# Patient Record
Sex: MALE | Race: WHITE | Employment: OTHER
[De-identification: names, ages, dates, MRNs, and addresses within clinical notes are randomized per-mention and may not be internally consistent; named-entity substitution may affect disease eponyms.]

---

## 2021-10-22 ENCOUNTER — NURSE TRIAGE (OUTPATIENT)
Dept: OTHER | Facility: CLINIC | Age: 71
End: 2021-10-22

## 2021-10-22 NOTE — TELEPHONE ENCOUNTER
Received call from [address] [facility] with Red Flag Complaint. Brief description of triage:   Pt c/o cluster headaches. Triage indicates for patient to see a provider within the next 2 weeks. Care advice provided, patient verbalizes understanding; denies any other questions or concerns; instructed to call back for any new or worsening symptoms. Writer provided warm transfer to [facility] for appointment scheduling. Attention Provider: Thank you for allowing me to participate in the care of your patient. The patient was connected to triage in response to information provided to the ECC/PSC. Please do not respond through this encounter as the response is not directed to a shared pool. Reason for Disposition   Headache is a chronic symptom (recurrent or ongoing AND present > 4 weeks)    Answer Assessment - Initial Assessment Questions  1. LOCATION: \"Where does it hurt? \"       Pt reports cluster headaches behind his right eye    2. ONSET: \"When did the headache start? \" (Minutes, hours or days)       Beginning of October     3. PATTERN: \"Does the pain come and go, or has it been constant since it started? \"      Comes and goes. He has 1-2 every day. Denies cluster headache currently. 4. SEVERITY: \"How bad is the pain? \" and \"What does it keep you from doing? \"  (e.g., Scale 1-10; mild, moderate, or severe)    - MILD (1-3): doesn't interfere with normal activities     - MODERATE (4-7): interferes with normal activities or awakens from sleep     - SEVERE (8-10): excruciating pain, unable to do any normal activities         Denies pain currently     5. RECURRENT SYMPTOM: \"Have you ever had headaches before? \" If so, ask: \"When was the last time? \" and \"What happened that time? \"       Yes, pt states that he has had cluster headaches for 40 years. He takes Imitrex as needed. 6. CAUSE: \"What do you think is causing the headache? \"      Cluster headaches    7.  MIGRAINE: \"Have you been diagnosed with migraine headaches? \" If so, ask: \"Is this headache similar? \"       Denies     8. HEAD INJURY: \"Has there been any recent injury to the head? \"       Denies     9. OTHER SYMPTOMS: \"Do you have any other symptoms? \" (fever, stiff neck, eye pain, sore throat, cold symptoms)      Denies     10. PREGNANCY: \"Is there any chance you are pregnant? \" \"When was your last menstrual period? \"        N/a    Protocols used: HEADACHE-ADULT-AH

## 2022-07-22 ENCOUNTER — HOSPITAL ENCOUNTER (OUTPATIENT)
Dept: PHYSICAL THERAPY | Age: 72
Setting detail: THERAPIES SERIES
Discharge: HOME | End: 2022-07-22
Payer: MEDICARE

## 2022-07-22 PROCEDURE — 97161 PT EVAL LOW COMPLEX 20 MIN: CPT | Performed by: PHYSICAL THERAPIST

## 2022-07-22 PROCEDURE — 97530 THERAPEUTIC ACTIVITIES: CPT | Performed by: PHYSICAL THERAPIST

## 2022-07-22 PROCEDURE — 97110 THERAPEUTIC EXERCISES: CPT | Performed by: PHYSICAL THERAPIST

## 2022-07-22 NOTE — PLAN OF CARE
[facility]patient [facility]  [address] [place], [place] [address], [address]  Phone: [phone]   Fax: [phone]                                            Physical Therapy Certification    Dear Dr Campos Espinoza,    We had the pleasure of evaluating the following patient for physical therapy services at [facility] ([facility] A summary of our findings can be found in the initial assessment below. This includes our plan of care. If you have any questions or concerns regarding these findings, please do not hesitate to contact me at the office phone number checked above. Thank you for the referral.       Physician Signature:_______________________________Date:__________________  By signing above (or electronic signature), therapist's plan is approved by physician      Patient: Kimberli Pina   : 1950   MRN: [id number]  Referring Physician:  Campos Espinoza MD      Evaluation Date: 2022      Medical Diagnosis Information:  Diagnosis: M25.552 (ICD-10-CM) - Pain in left hip;  M25.559 (ICD-10-CM) - Pain in joint, pelvic region and thigh   Treatment Diagnosis: M25.552 - Left Hip Pain                                         Insurance information: PT Insurance Information: Medicare     Precautions/ Contra-indications:   Latex Allergy:  [x]NO      []YES   Preferred Language for Healthcare:   [x]English       []other:  C-SSRS Triggered by Intake questionnaire (Past 2 wk assessment):   [x] No, Questionnaire did not trigger screening.   [] Yes, Patient intake triggered further evaluation      [] C-SSRS Screening completed  [] PCP notified via Plan of Care  [] Emergency services notified    SUBJECTIVE:  History of Present Illness:      Pt presents with c/o Left hip pain, specifically after prolonged sitting. States pain is alleviated after being up and moving around for a while. States that in March of this year, he went on a long run, which seemed to exacerbate symptoms.  Pt reports that he is still able to walk for > 1 hr and runs just about 5 days/week for about 40 min. Pt reports pain is over lateral hip, and into anterior thigh. Pain       Patient reports pain is 5 /10 pain at present and 5 /10 pain at its worst.  Pain increases with: prolonged sitting, initial stance after sitting (takes a while to loosen up)        Decreases with: rest    Pain description:  sharp  Pt. reports pain with coughing, sneezing and laughing:   []Yes   [x]No    []NA     Current Functional Limitations:   [x]Yes   []No  Functional Complaints:      PLOF:   [x]No functional limitations   []Pre-exisiting limitations:  Pt's sleep is affected?    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    Co-morbidities/Complexities (which will affect course of rehabilitation): 2  []None           Arthritic conditions   []Rheumatoid arthritis (M05.9)  []Osteoarthritis (M19.91)   Cardiovascular conditions   [x]Hypertension (I10)  []Hyperlipidemia (E78.5)  []Angina pectoris (I20)  []Atherosclerosis (I70)   Musculoskeletal conditions   []Disc pathology   []Congenital spine pathologies   []Prior surgical intervention  []Osteoporosis (M81.8)  [x]Osteopenia (M85.8)   Endocrine conditions   []Hypothyroid (E03.9)  []Hyperthyroid Gastrointestinal conditions   []Constipation (F90.01)   Metabolic conditions   []Morbid obesity (E66.01)  []Diabetes type 1(E10.65) or 2 (E11.65)   []Neuropathy (G60.9)     Pulmonary conditions   []Asthma (J45)  []Coughing   []COPD (J44.9)   Psychological Disorders  []Anxiety (F41.9)  []Depression (F32.9)   []Other:   []Covid-19    []Other:         Occupation/School:  - working part time    Sports/Hobbies/Recreational Activities: running    OBJECTIVE:     Functional Scale: FOTO   Score: 77/100 (23% impaired)    Gait/Steps     [x]Gait WNL unless otherwise noted below:                             []Deviations on a level linoleum surface include:      [x]Steps WNL (reciprocal pattern with 0-1 rail) unless otherwise noted below:    []Deviations include:       Posture: [] WNL  [x]Forward head   []Forward flexed trunk    []Scoliosis   []Decreased WB on   []R   []L     [x]Other:  thoracic kyphosis     Quick Tests/Functional Myotome Tests:   Heel Walk (L4):      [x]NT  []Able to perform WNL   []Unable to perform         Toe Walk (S1):       [x]NT  []Able to perform WNL   []Unable to perform        Flexibility    [] All tested [facility]    [x] Deficits indicated as follows:    Muscle Abnormal Findings   Hip flexors/Gee  []Decreased R   [x]Decreased L      Hamstrings  Degrees in 90/90 [x]Decreased R   [x]Decreased L         Right:                   Left:      Gastrocs   []Decreased R   []Decreased L      Obers/TFL/ITB   []Decreased R   [x]Decreased L      Piriformis    []Decreased R   []Decreased L      Other:    []Decreased R   []Decreased L        Lower Extremity Range of Motion/Strength Testing-Myotomes    [x]All ROM WFL except as marked below   [x]All strength WFL (5/5) except as marked below    [x]All myotomes WFL (5/5) except as marked below      Range Tested MMT/ Resisted PROM AROM Comments   *denotes pain Left Right Left Right Left Right    Hip Flexion  (L1-2) 4/5 4+/5        Hip Extension          Hip Abduction  (L5) 4+/5 4+/5        Hip Adduction  (L3)          Hip IR          Hip ER          Knee Flexion  (L5,S1) 5/5 5/5        Knee Extension  (L3,4) 4/5 4+/5        Ankle Dorsiflex  (L4) 5/5 5/5            Special Tests Lumbosacral and hip- supine/sidelying/prone    (L) = Laslett's Criteria: 2 positive tests  Special Test Abnormal Findings   Sit up test/ Supine Long sit test  (C) []Neg   []Pos R   []Pos L     Comments:    SI distraction                               (L) []Neg   []Pos   []NT   Thigh Thrust test                         (L) []Neg   []Pos R   []Pos L      90/90 test  []Neg   []Pos R   []Pos L      Gaenslen's test []Neg   []Pos R   []Pos L      Straight Leg Raise [x]Neg   []Pos R   []Pos L      Crams []Neg   []Pos R   []Pos L      Lumbar Distraction  []Relief noted   []No relief noted  []Rebound pain   []NT   Hip scour [x]Neg   []Pos R   []Pos L      Chandu's test [x]Neg   []Pos R   []Pos L      Rober's test []Neg   []Pos R   []Pos L      Oscillation []Neg   []Pos R   []Pos L      Ant/Post Provocation  []Neg   []Pos R   []Pos L      SI compression                           (L) []Neg   []Pos      Prone knee flexion test               (C) [x]Neg   []Pos R   []Pos L     Comments:    Femoral nerve tension test []Neg   []Pos R   []Pos L      Pheasant test []Neg   []Pos R   []Pos L      Sacral thrusts                              (L) []Neg   []Pos     []Base   []Norman   []R Sacral Sulcus  []L Sacral Sulcus   []R CHAVA   []L CHAVA     Deep Tendon Reflexes     [x] Not Tested   []All reflexes WNL or 2+ except as marked below  Abnormal Reflex Findings Left Right Comments   Elvira's Reflex      Biceps (C5,6)      Brachioradialis (C6)      Triceps (C7)      Quadriceps (L3,4)     []Pendular x 3 R  []Pendular x 3 L   Achilles (S1,2)      Ankle clonus , # of beats      Babinski's reflex           Dermatomal Sensation   [x]All dermatomes WFL for light touch except as marked below  Abnormal Dermatome Findings Left Right   Anterior groin, 2-3 inches below ASIS (L1-L2)     Middle third anterior thigh (L3)     Patella and med malleolus (L4)     Fibular head and dorsum of foot (L5)     Lateral side and plantar surface of foot (S1)     Medial aspect of posterior thigh (S2)                   Palpation     Patient reported tenderness with palpation:  []Yes   [x]No   []NA  Location:    PT notes warmth:  []Yes   [x]No   []NA  Location:   PT notes increased muscle tone:    []Yes   [x]No   []NA  Location:   PT notes crepitus with palpation:    []Yes   [x]No   []NA   Location:   Ligament tenderness/provocation:    []Yes   [x]No   []NA  Location:   PT notes decreased scar mobility:    []Yes   [x]No   []NA  Location:      Appearance    PT notes swelling:    []Yes   []No   []NA  Location:     PT notes redness:   []Yes   []No   []NA  Location:   PT notes drainage:    []Yes   []No   []NA  Location:      Girth Measurements (cm)   [x]NT     Location Left Right                              Specific Joint Mobility Testing/Accessory Motions:    [x]NT  Lumbar:  Hip:  Knee/patella: Ankle:    Bandages/Dressings/Incisions: [x]N/A    Falls Risk Assessment (30 days):   [x] Falls Risk assessed and no intervention required. [] Falls Risk assessed and Patient requires intervention due to being higher risk   TUG score (>12s at risk):     [] Falls education provided, including:                       [x] Patient history, allergies, meds reviewed. Medical chart reviewed. See intake form. Review Of Systems (ROS):  [x]Performed Review of systems (Integumentary, CardioPulmonary, Neurological) by intake and observation. Intake form has been scanned into medical record. Patient has been instructed to contact their primary care physician regarding ROS issues if not already being addressed at this time. ASSESSMENT: Pt is  71 Y. O  male, presenting with c/o  L hip pain. Assessment reveals deficits in strength, ROM, alignment as well as increased pain. Pt will benefit from skilled PT to address these deficits and promote return to highest level of functional independence.       Barriers to/and or personal factors that will affect rehab potential:           []Age  []Sex    []Smoker            []Motivation/Lack of Motivation                      [x]Co-Morbidities            []Cognitive Function, education/learning barriers            []Environmental, home barriers            []profession/work barriers  [x]past PT/medical experience  []other:  Justification:     Functional Impairments:     []Noted lumbar/proximal hip/LE hypomobility   [x]Decreased LE functional ROM   []Decreased core/proximal hip strength and neuromuscular control   [x]Decreased LE functional strength   []Reduced balance/proprioceptive control   []Other:      Functional Activity Limitations (from functional questionnaire and intake)   []Reduced ability to tolerate prolonged functional positions   [x]Reduced ability or difficulty with changes of positions or transfers between positions   []Reduced ability to maintain good posture and demonstrate good body mechanics with sitting, bending, and lifting   []Reduced ability to sleep   []Reduced ability or tolerance with driving and/or computer work   []Reduced ability to perform lifting, carrying tasks   [x]Reduced ability to squat   []Reduced ability to forward bend   []Reduced ability to ambulate prolonged functional periods/distances/surfaces   []Reduced ability to ascend/descend stairs   []Reduced ability to run, hop or jump   [x]Other: kneeling     Participation Restrictions   []Reduced participation in self-care activities   []Reduced participation in home management activities   [x]Reduced participation in work activities   []Reduced participation in social activities   []Reduced participation in sport activities    Classification :    []Signs/symptoms consistent with post-surgical status including decreased ROM, strength and function.    [x]Signs/symptoms consistent with joint sprain/strain   []Signs/symptoms consistent with Osteoarthritis   []Signs/symptoms consistent with functional weakness/NMR control      []Signs/symptoms consistent with tendinitis/tendinosis    []Signs/symptoms consistent with pathology which may benefit from Dry needling     []Other:     Prognosis/Rehab Potential:      []Excellent   [x]Good    []Fair   []Poor    Tolerance of evaluation/treatment:    []Excellent   [x]Good    []Fair   []Poor     Physical Therapy Evaluation Complexity Justification  [x] A history of present problem with:  [] no personal factors and/or comorbidities that impact the plan of care;  [x]1-2 personal factors and/or comorbidities that impact the plan of care  []3 personal factors and/or comorbidities that impact the plan of care  [x] An examination of body systems using standardized tests and measures addressing any of the following: body structures and functions (impairments), activity limitations, and/or participation restrictions:  [x] a total of 1-2 or more elements   [] a total of 3 or more elements   [] a total of 4 or more elements   [x] A clinical presentation with:  [x] stable and/or uncomplicated characteristics   [] evolving clinical presentation with changing characteristics  [] unstable and unpredictable characteristics;   [x] Clinical decision making of [] low, [] moderate, [] high complexity using standardized patient assessment instrument and/or measurable assessment of functional outcome. [x] EVAL (LOW) 66057 (typically 20 minutes face-to-face)  [] EVAL (MOD) 70772 (typically 30 minutes face-to-face)  [] EVAL (HIGH) 29124 (typically 45 minutes face-to-face)  [] RE-EVAL    PLAN:  Frequency/Duration:  2 days per week for 8 Weeks:  Interventions:  [x]  (40783) Therapeutic exercise including: strength training, ROM, for Lower extremity and core   [x]  (94769) NMR activation and proprioception for LE, Glutes and Core. [x]  (26359) Manual therapy as indicated for LE, Hip and spine to include: Dry Needling/IASTM, STM, PROM, Gr I-IV mobilizations, manipulation.    [x] (08600) Therapeutic activities for LE and core.  []  (16739) Gait Training including gait normalization and reducing fall risk. [x]  Modalities as needed that may include: thermal agents, E-stim, Biofeedback, US, iontophoresis as indicated  [x]  Patient education on joint protection, postural re-education, activity modification, progression of HEP      GOALS:  Patient stated goal: \"Eliminate hip/thigh tightness\"  [] Progressing: [] Met: [] Not Met: [] Adjusted    Therapist goals for Patient:   Short Term Goals: To be achieved in: 2 weeks  1. Independent in HEP and progression per patient tolerance, in order to prevent re-injury. [] Progressing: [] Met: [] Not Met: [] Adjusted  2. Patient will have a decrease in pain to facilitate improvement in movement, function, and ADLs as indicated by Functional Deficits. [] Progressing: [] Met: [] Not Met: [] Adjusted    Long Term Goals: To be achieved in: 8 weeks  1. Disability index score of 15% or less on the FOTO Hip to assist with reaching prior level of function. [] Progressing: [] Met: [] Not Met: [] Adjusted  2. Patient will demonstrate increased L Hip functional AROM WNL and equal to non-involved side to allow for proper joint functioning as indicated by patients Functional Deficits. [] Progressing: [] Met: [] Not Met: [] Adjusted  3. Patient will demonstrate an increase in Strength to good proximal hip strength and control, to allow for proper functional mobility as indicated by patients Functional Deficits. [] Progressing: [] Met: [] Not Met: [] Adjusted  4. Patient will return to running/walking with briefcase without increased symptoms or restriction.    [] Progressing: [] Met: [] Not Met: [] Adjusted      Electronically signed by:   , THELMA [id number]   Lisy Monaco, PT, PT, DPT

## 2022-07-22 NOTE — FLOWSHEET NOTE
[facility] Outpatient Therapy  [address] [place] [address] [address], [address]  Phone: [phone]   Fax: [phone]    Physical Therapy Treatment Note/ Progress Report:     Date:  2022    Patient Name:  Palma Oviedo    :  1950  MRN: [id number]    Medical/Treatment Diagnosis Information:  Diagnosis: M25.552 (ICD-10-CM) - Pain in left hip;  M25.559 (ICD-10-CM) - Pain in joint, pelvic region and thigh  Treatment Diagnosis: M25.552 - Left Hip Pain  Insurance/Certification information:  PT Insurance Information: Medicare  Physician Information:  Asha Ron MD  Plan of care signed:    [] Yes  [x] No    Date of Patient follow up with Physician:      Progress Report: []  Yes  [x]  No     Date Range for reporting period:  Beginnin2022  Ending:      Progress report due (10 Rx/or 30 days whichever is less): 4772     Recertification due (POC duration/ or 90 days whichever is less): 2022     Visit # Insurance Allowable Auth Needed    BMN []Yes   [x]No     RESTRICTIONS/PRECAUTIONS:   Latex Allergy:  [x]NO      []YES  Preferred Language for Healthcare:   [x]English       []other:    Pain level:  5/10     SUBJECTIVE:  See Eval    OBJECTIVE:  See Eval  Observation:   Test measurements:      Functional Scale: FOTO Hip   Score: 77/100 (23% impairment)  Date assessed: 2022      Exercises/Interventions: Exercises in bold performed in department today. Items not bolded are carried forward from prior visits for continuity of the record. Exercise/Equipment Resistance/Repetitions HEP Other comments   See HEP Trial all HEP exercises 10 x  []      []      []      []      []      []      []      []      []      []      []      []      []      []      []      []      []      []      Home Exercise Program:  Access Code: [email or web address]  URL: [email or web address]  Date: 2022  Prepared by: Estella Avila    Exercises  ITB Stretch at Wall - 1 x daily - 7 x weekly - 10 reps - 10 hold  Supine ITB Stretch with Strap - 1 x daily - 7 x weekly - 10 reps - 10 hold  Prone Quadriceps Stretch with Strap - 1 x daily - 7 x weekly - 10 reps - 10 hold      Therapeutic Exercise: (13 min)   [x] (97161) Provided verbal/tactile cueing for activities related to strengthening, flexibility, endurance, ROM for improvements in LE, proximal hip, and core control with self-care, mobility, lifting, ambulation. Neuromuscular Facilitation:   [] (77425) Provided verbal/tactile cueing for activities related to improving balance, coordination, kinesthetic sense, posture, motor skill, proprioception to assist with LE, proximal hip, and core control in self-care, mobility, lifting, ambulation and eccentric single leg control. Therapeutic Activities: (12 min)  PT educates patient on HEP, therapy plan of care, activity modification and postures/carrying techniques. [x] (79749) Provided verbal/tactile cueing for activities related to improving balance, coordination, kinesthetic sense, posture, motor skill, proprioception and motor activation to allow for proper function of core, proximal hip and LE with self-care and ADLs and functional mobility.      Gait Training:   [] (00942) Provided training and instruction to the patient for proper LE, core and proximal hip recruitment and positioning and eccentric body weight control with ambulation re-education including up and down stairs     Home Exercise Program:    [] (47818) Reviewed/Progressed HEP activities related to strengthening, flexibility, endurance, ROM of core, proximal hip and LE for functional self-care, mobility, lifting and ambulation/stair navigation   [] (07855) Reviewed/Progressed HEP activities related to improving balance, coordination, kinesthetic sense, posture, motor skill, proprioception of core, proximal hip and LE for self-care, mobility, lifting, and ambulation/stair navigation      Manual Treatments:   [] (81675) Provided manual therapy to mobilize LE, proximal hip and/or LS spine soft tissue/joints for the purpose of modulating pain, promoting relaxation,  increasing ROM, reducing/eliminating soft tissue swelling/inflammation/restriction, improving soft tissue extensibility and allowing for proper ROM for normal function with self-care, mobility, lifting and ambulation. Modalities:    [] Electric Stimulation:   [] Ultrasound:   [] Other:       Charges:  Timed Code Treatment Minutes: 25 min   Total Treatment Minutes: 50 min      [x] EVAL (LOW) 76133 (typically 20 minutes face-to-face)  [] EVAL (MOD) 00401 (typically 30 minutes face-to-face)  [] EVAL (HIGH) 35699 (typically 45 minutes face-to-face)  [] RE-EVAL     [x] YA(04178) x     1  [] NMR (36513) x       [] Manual (54855) x       [x] TA (40241) x     1  [] Gait Training (03113) x       [] ES(attended) (88467)  [] ES (un) (28870)   [] Mech Traction (99024)  [] Ultrasound (40743)  [] Other:      GOALS:  Patient stated goal: \"Eliminate hip/thigh tightness\"  [] Progressing: [] Met: [] Not Met: [] Adjusted     Therapist goals for Patient:  Short Term Goals: To be achieved in: 2 weeks  1. Independent in HEP and progression per patient tolerance, in order to prevent re-injury. [] Progressing: [] Met: [] Not Met: [] Adjusted  2. Patient will have a decrease in pain to facilitate improvement in movement, function, and ADLs as indicated by Functional Deficits. [] Progressing: [] Met: [] Not Met: [] Adjusted     Long Term Goals: To be achieved in: 8 weeks  1. Disability index score of 15% or less on the FOTO Hip to assist with reaching prior level of function. [] Progressing: [] Met: [] Not Met: [] Adjusted  2. Patient will demonstrate increased L Hip functional AROM WNL and equal to non-involved side to allow for proper joint functioning as indicated by patients Functional Deficits. [] Progressing: [] Met: [] Not Met: [] Adjusted  3.  Patient will demonstrate an increase in Strength to good proximal hip strength and control, to allow for proper functional mobility as indicated by patients Functional Deficits. [] Progressing: [] Met: [] Not Met: [] Adjusted  4. Patient will return to running/walking with briefcase without increased symptoms or restriction. [] Progressing: [] Met: [] Not Met: [] Adjusted    ASSESSMENT:  See Eval    Treatment/Activity Tolerance:  [x] Patient tolerated treatment well [] Patient limited by fatique  [] Patient limited by pain  [] Patient limited by other medical complications  [] Other:     Overall Progression Towards Functional goals/ Treatment Progress Update:  [] Patient is progressing as expected towards functional goals listed. [] Progression is slowed due to complexities/Impairments listed. [] Progression has been slowed due to co-morbidities. [x] Plan just implemented, too soon to assess goals progression <30days   [] Goals require adjustment due to lack of progress  [] Patient is not progressing as expected and requires additional follow up with physician  [] Other    Prognosis for POC: [x] Good [] Fair  [] Poor    Patient requires continued skilled intervention: [x] Yes  [] No        PLAN: See eval  [] Continue per plan of care [] Alter current plan (see comments)  [x] Plan of care initiated [] Hold pending MD visit [] Discharge    Electronically signed by:  , PT [id number]   Drea Yeh, PT, PT, DPT    Note: If patient does not return for scheduled/recommended follow up visits, this note will serve as a discharge from care along with the most recent update on progress.

## 2022-07-26 ENCOUNTER — HOSPITAL ENCOUNTER (OUTPATIENT)
Dept: PHYSICAL THERAPY | Age: 72
Setting detail: THERAPIES SERIES
Discharge: HOME | End: 2022-07-26
Payer: MEDICARE

## 2022-07-26 NOTE — PATIENT CARE CONFERENCE
[facility]  [address], [address]  Phone: [phone]   Fax: [phone]    Physical Therapy Missed Visit Note     Date:  2022    Patient Name:  Mi Vinson      :  1950    MRN: [id number]      Cancelled visits to date: 0  No-shows to date: 1    For today's appointment patient:  []  Cancelled  []  Rescheduled appointment  [x]  No-show     Reason given by patient:  []  Patient ill  []  Conflicting appointment  []  No transportation    []  Conflict with work  [x]  No reason given  []  Other:     Comments:      Electronically signed by:  Austin Goldberg PT, PT

## 2022-08-01 ENCOUNTER — HOSPITAL ENCOUNTER (OUTPATIENT)
Dept: PHYSICAL THERAPY | Age: 72
Setting detail: THERAPIES SERIES
Discharge: HOME | End: 2022-08-01
Payer: MEDICARE

## 2022-08-01 PROCEDURE — 97110 THERAPEUTIC EXERCISES: CPT | Performed by: PHYSICAL THERAPIST

## 2022-08-01 PROCEDURE — 97140 MANUAL THERAPY 1/> REGIONS: CPT | Performed by: PHYSICAL THERAPIST

## 2022-08-01 NOTE — FLOWSHEET NOTE
[facility] Outpatient Therapy  [address] [employer], [address], [address]  Phone: [phone]   Fax: [phone]    Physical Therapy Treatment Note/ Progress Report:     Date: 2022    Patient Name:  Marcell Vora    :  1950  MRN: [id number]    Medical/Treatment Diagnosis Information:  Diagnosis: M25.552 (ICD-10-CM) - Pain in left hip;  M25.559 (ICD-10-CM) - Pain in joint, pelvic region and thigh  Treatment Diagnosis: M25.552 - Left Hip Pain  Insurance/Certification information:  PT Insurance Information: Medicare  Physician Information:  Alex Rajput MD  Plan of care signed:    [x] Yes - see media tab  [] No    Date of Patient follow up with Physician:      Progress Report: []  Yes  [x]  No     Date Range for reporting period:  Beginnin2022  Ending:      Progress report due (10 Rx/or 30 days whichever is less): 2199     Recertification due (POC duration/ or 90 days whichever is less): 2022     Visit # Insurance Allowable Auth Needed    BMN []Yes   [x]No     RESTRICTIONS/PRECAUTIONS:   Latex Allergy:  [x]NO      []YES  Preferred Language for Healthcare:   [x]English       []other:    Pain level:  0/10     SUBJECTIVE:  Pt reports that soreness/tightness is mainly upon first standing up from sitting. Overall feels like the stretches are helping some. OBJECTIVE:    Observation: Bilateral hip flexor and quad tightness  Test measurements:      Functional Scale: FOTO Hip   Score: 77/100 (23% impairment)  Date assessed: 2022      Exercises/Interventions: Exercises in bold performed in department today. Items not bolded are carried forward from prior visits for continuity of the record.     Exercise/Equipment Resistance/Repetitions HEP Other comments   Recumbent Bike 5 min  Resistance 2.5  Seat 11 []    Prone Hip Flexor Stretch with Strap 10 x 10\" hold, R/L [x]    Standing Hip Flexor Stretch 1 x 10\" R/L [x]      []      []      []      [] positioning and eccentric body weight control with ambulation re-education including up and down stairs     Home Exercise Program:    [] (44460) Reviewed/Progressed HEP activities related to strengthening, flexibility, endurance, ROM of core, proximal hip and LE for functional self-care, mobility, lifting and ambulation/stair navigation   [] (27133) Reviewed/Progressed HEP activities related to improving balance, coordination, kinesthetic sense, posture, motor skill, proprioception of core, proximal hip and LE for self-care, mobility, lifting, and ambulation/stair navigation      Manual Treatments: (24 min) PT performs manual hamsting, piriformis, glute, and IT band stretches bilaterally with patient in supine, and manual quad stretching bilaterally with patient in prone. [x] (67923) Provided manual therapy to mobilize LE, proximal hip and/or LS spine soft tissue/joints for the purpose of modulating pain, promoting relaxation,  increasing ROM, reducing/eliminating soft tissue swelling/inflammation/restriction, improving soft tissue extensibility and allowing for proper ROM for normal function with self-care, mobility, lifting and ambulation. Modalities:    [] Electric Stimulation:   [] Ultrasound:   [] Other:       Charges:  Timed Code Treatment Minutes: 39 min   Total Treatment Minutes: 39 min        [] RE-EVAL     [x] GK(12291) x     1  [] NMR (93800) x       [x] Manual (03147) x     2  [] TA (99183) x       [] Gait Training (19906) x       [] ES(attended) (88622)  [] ES (un) (75195)   [] Mech Traction (02912)  [] Ultrasound (77816)  [] Other:      GOALS:  Patient stated goal: \"Eliminate hip/thigh tightness\"  [] Progressing: [] Met: [] Not Met: [] Adjusted     Therapist goals for Patient:  Short Term Goals: To be achieved in: 2 weeks  1. Independent in HEP and progression per patient tolerance, in order to prevent re-injury. [] Progressing: [] Met: [] Not Met: [] Adjusted  2.  Patient will have a decrease in pain to facilitate improvement in movement, function, and ADLs as indicated by Functional Deficits. [] Progressing: [] Met: [] Not Met: [] Adjusted     Long Term Goals: To be achieved in: 8 weeks  1. Disability index score of 15% or less on the FOTO Hip to assist with reaching prior level of function. [] Progressing: [] Met: [] Not Met: [] Adjusted  2. Patient will demonstrate increased L Hip functional AROM WNL and equal to non-involved side to allow for proper joint functioning as indicated by patients Functional Deficits. [] Progressing: [] Met: [] Not Met: [] Adjusted  3. Patient will demonstrate an increase in Strength to good proximal hip strength and control, to allow for proper functional mobility as indicated by patients Functional Deficits. [] Progressing: [] Met: [] Not Met: [] Adjusted  4. Patient will return to running/walking with briefcase without increased symptoms or restriction. [] Progressing: [] Met: [] Not Met: [] Adjusted    ASSESSMENT:  Patient presents to PT with LLE tightness, stiffness, and pain. Patient tolerating therapy well, and seeing improvements in symptoms with stretching. Pt will continue to benefit from skilled PT to reduce pain and to return to PLOF. Treatment/Activity Tolerance:  [x] Patient tolerated treatment well [] Patient limited by fatique  [] Patient limited by pain  [] Patient limited by other medical complications  [] Other:     Overall Progression Towards Functional goals/ Treatment Progress Update:  [] Patient is progressing as expected towards functional goals listed. [] Progression is slowed due to complexities/Impairments listed. [] Progression has been slowed due to co-morbidities.   [x] Plan just implemented, too soon to assess goals progression <30days   [] Goals require adjustment due to lack of progress  [] Patient is not progressing as expected and requires additional follow up with physician  [] Other    Prognosis for POC: [x] Good [] Fair  [] Poor    Patient requires continued skilled intervention: [x] Yes  [] No        PLAN: 2x/week for 8 weeks  [x] Continue per plan of care [] Alter current plan (see comments)  [] Plan of care initiated [] Hold pending MD visit [] Discharge    Electronically signed by:  , PT [id number]   Randa Wang, PT, PT, DPT    Note: If patient does not return for scheduled/recommended follow up visits, this note will serve as a discharge from care along with the most recent update on progress.

## 2022-08-03 ENCOUNTER — HOSPITAL ENCOUNTER (OUTPATIENT)
Dept: PHYSICAL THERAPY | Age: 72
Setting detail: THERAPIES SERIES
Discharge: HOME | End: 2022-08-03
Payer: MEDICARE

## 2022-08-03 PROCEDURE — 97110 THERAPEUTIC EXERCISES: CPT | Performed by: PHYSICAL THERAPIST

## 2022-08-03 PROCEDURE — 97140 MANUAL THERAPY 1/> REGIONS: CPT | Performed by: PHYSICAL THERAPIST

## 2022-08-03 NOTE — FLOWSHEET NOTE
[facility] Outpatient [facility]  [address] [place] [address] [address], [address]  Phone: [phone]   Fax: [phone]    Physical Therapy Treatment Note/ Progress Report:     Date: 2022    Patient Name:  Ev Dalal    :  1950  MRN: [id number]    Medical/Treatment Diagnosis Information:  Diagnosis: M25.552 (ICD-10-CM) - Pain in left hip;  M25.559 (ICD-10-CM) - Pain in joint, pelvic region and thigh  Treatment Diagnosis: M25.552 - Left Hip Pain  Insurance/Certification information:  PT Insurance Information: Medicare  Physician Information:  Juliana Malik MD  Plan of care signed:    [x] Yes - see media tab  [] No    Date of Patient follow up with Physician:      Progress Report: []  Yes  [x]  No     Date Range for reporting period:  Beginnin2022  Ending:      Progress report due (10 Rx/or 30 days whichever is less):      Recertification due (POC duration/ or 90 days whichever is less): 2022     Visit # Insurance Allowable Auth Needed   3  / 16 BMN []Yes   [x]No     RESTRICTIONS/PRECAUTIONS:   Latex Allergy:  [x]NO      []YES  Preferred Language for Healthcare:   [x]English       []other:    Pain level:  0/10     SUBJECTIVE:  Pt reports that he did not have pain or as much stiffness when he woke up this morning. OBJECTIVE:    Observation: Bilateral hip flexor and quad tightness  Test measurements:      Functional Scale: FOTO Hip   Score: 77/100 (23% impairment)  Date assessed: 2022      Exercises/Interventions: Exercises in bold performed in department today. Items not bolded are carried forward from prior visits for continuity of the record.     Exercise/Equipment Resistance/Repetitions HEP Other comments   Recumbent Bike 8 min  Resistance 2.5  Seat 11 []    Prone Hip Flexor Stretch with Strap  [x]    Standing Hip Flexor Stretch  [x]    Foam Roll   Sidelying on L IT Band 3 min [x]    \"The Stick\" Massage to L IT Band 3 min []    Sidelying Clamshells with Resistance 20 x Green TB around knees [x]      []      []      []      []      []      []      []      []      []      []      []      []      Home Exercise Program:  Access Code: [email or web address]  URL: [email or web address]  Date: 07/25/2022  Prepared by: Jaden Cesar    Exercises  ITB Stretch at Wall - 1 x daily - 7 x weekly - 10 reps - 10 hold  Supine ITB Stretch with Strap - 1 x daily - 7 x weekly - 10 reps - 10 hold  Prone Quadriceps Stretch with Strap - 1 x daily - 7 x weekly - 10 reps - 10 hold      Date: 08/01/2022  Prepared by: Jaden Cesar    Exercises  Standing Quadriceps Stretch - 1 x daily - 7 x weekly - 10 reps - 10 hold  Prone Hip Flexor Stretch on Table with Strap - 1 x daily - 7 x weekly - 10 reps - 10 hold  Standing Hip Flexor Stretch - 1 x daily - 7 x weekly - 10 reps - 10 hold  Supine Hamstring Stretch with Strap - 1 x daily - 7 x weekly - 10 reps - 10 hold  Standing Hamstring Stretch with Step - 1 x daily - 7 x weekly - 10 reps - 10 hold      Therapeutic Exercise: (17 min)   [x] (95176) Provided verbal/tactile cueing for activities related to strengthening, flexibility, endurance, ROM for improvements in LE, proximal hip, and core control with self-care, mobility, lifting, ambulation. Neuromuscular Facilitation:   [] (21430) Provided verbal/tactile cueing for activities related to improving balance, coordination, kinesthetic sense, posture, motor skill, proprioception to assist with LE, proximal hip, and core control in self-care, mobility, lifting, ambulation and eccentric single leg control. Therapeutic Activities:   [] (17331) Provided verbal/tactile cueing for activities related to improving balance, coordination, kinesthetic sense, posture, motor skill, proprioception and motor activation to allow for proper function of core, proximal hip and LE with self-care and ADLs and functional mobility.      Gait Training:   [] (73720) Provided training and instruction to the patient for proper LE, core and proximal hip recruitment and positioning and eccentric body weight control with ambulation re-education including up and down stairs     Home Exercise Program:    [] (32280) Reviewed/Progressed HEP activities related to strengthening, flexibility, endurance, ROM of core, proximal hip and LE for functional self-care, mobility, lifting and ambulation/stair navigation   [] (77143) Reviewed/Progressed HEP activities related to improving balance, coordination, kinesthetic sense, posture, motor skill, proprioception of core, proximal hip and LE for self-care, mobility, lifting, and ambulation/stair navigation      Manual Treatments: (33 min) PT performs manual hamsting, piriformis, glute, and IT band stretches bilaterally with patient in supine, and manual quad stretching bilaterally with patient in prone. [x] (94204) Provided manual therapy to mobilize LE, proximal hip and/or LS spine soft tissue/joints for the purpose of modulating pain, promoting relaxation,  increasing ROM, reducing/eliminating soft tissue swelling/inflammation/restriction, improving soft tissue extensibility and allowing for proper ROM for normal function with self-care, mobility, lifting and ambulation. Modalities:    [] Electric Stimulation:   [] Ultrasound:   [] Other:       Charges:  Timed Code Treatment Minutes: 50 min   Total Treatment Minutes: 50 min        [] RE-EVAL     [x] NX(81356) x     1  [] NMR (75248) x       [x] Manual (31969) x     2  [] TA (05053) x       [] Gait Training (28548) x       [] ES(attended) (19349)  [] ES (un) (17725)   [] Mech Traction (16002)  [] Ultrasound (50817)  [] Other:      GOALS:  Patient stated goal: \"Eliminate hip/thigh tightness\"  [] Progressing: [] Met: [] Not Met: [] Adjusted     Therapist goals for Patient:  Short Term Goals: To be achieved in: 2 weeks  1. Independent in HEP and progression per patient tolerance, in order to prevent re-injury.   [] Progressing: [] Met: [] Not Met: [] Adjusted  2. Patient will have a decrease in pain to facilitate improvement in movement, function, and ADLs as indicated by Functional Deficits. [] Progressing: [] Met: [] Not Met: [] Adjusted     Long Term Goals: To be achieved in: 8 weeks  1. Disability index score of 15% or less on the FOTO Hip to assist with reaching prior level of function. [] Progressing: [] Met: [] Not Met: [] Adjusted  2. Patient will demonstrate increased L Hip functional AROM WNL and equal to non-involved side to allow for proper joint functioning as indicated by patients Functional Deficits. [] Progressing: [] Met: [] Not Met: [] Adjusted  3. Patient will demonstrate an increase in Strength to good proximal hip strength and control, to allow for proper functional mobility as indicated by patients Functional Deficits. [] Progressing: [] Met: [] Not Met: [] Adjusted  4. Patient will return to running/walking with briefcase without increased symptoms or restriction. [] Progressing: [] Met: [] Not Met: [] Adjusted    ASSESSMENT:  Patient presents to PT with LLE tightness, stiffness, and pain. Patient tolerating therapy well, and seeing improvements in symptoms with stretching. Pt will continue to benefit from skilled PT to reduce pain and to return to PLOF. Treatment/Activity Tolerance:  [x] Patient tolerated treatment well [] Patient limited by fatique  [] Patient limited by pain  [] Patient limited by other medical complications  [] Other:     Overall Progression Towards Functional goals/ Treatment Progress Update:  [] Patient is progressing as expected towards functional goals listed. [] Progression is slowed due to complexities/Impairments listed. [] Progression has been slowed due to co-morbidities.   [x] Plan just implemented, too soon to assess goals progression <30days   [] Goals require adjustment due to lack of progress  [] Patient is not progressing as expected and requires additional follow up with physician  [] Other    Prognosis for POC: [x] Good [] Fair  [] Poor    Patient requires continued skilled intervention: [x] Yes  [] No        PLAN: 2x/week for 8 weeks  [x] Continue per plan of care [] Alter current plan (see comments)  [] Plan of care initiated [] Hold pending MD visit [] Discharge    Electronically signed by:  , PT [id number]   Shanon Smith, PT, PT, DPT    Note: If patient does not return for scheduled/recommended follow up visits, this note will serve as a discharge from care along with the most recent update on progress.

## 2022-08-08 ENCOUNTER — HOSPITAL ENCOUNTER (OUTPATIENT)
Dept: PHYSICAL THERAPY | Age: 72
Setting detail: THERAPIES SERIES
Discharge: HOME | End: 2022-08-08
Payer: MEDICARE

## 2022-08-08 PROCEDURE — 97110 THERAPEUTIC EXERCISES: CPT | Performed by: PHYSICAL THERAPIST

## 2022-08-08 PROCEDURE — 97140 MANUAL THERAPY 1/> REGIONS: CPT | Performed by: PHYSICAL THERAPIST

## 2022-08-08 NOTE — FLOWSHEET NOTE
[facility] Outpatient Therapy  [address] [place] [address] [address], [address]  Phone: [phone]   Fax: [phone]    Physical Therapy Treatment Note/ Progress Report:     Date: 2022    Patient Name:  Polly Jacobson    :  1950  MRN: [id number]    Medical/Treatment Diagnosis Information:  Diagnosis: M25.552 (ICD-10-CM) - Pain in left hip;  M25.559 (ICD-10-CM) - Pain in joint, pelvic region and thigh  Treatment Diagnosis: M25.552 - Left Hip Pain  Insurance/Certification information:  PT Insurance Information: Medicare  Physician Information:  Dante Tejada MD  Plan of care signed:    [x] Yes - see media tab  [] No    Date of Patient follow up with Physician:      Progress Report: []  Yes  [x]  No     Date Range for reporting period:  Beginnin2022  Ending:      Progress report due (10 Rx/or 30 days whichever is less):      Recertification due (POC duration/ or 90 days whichever is less): 2022     Visit # Insurance Allowable Auth Needed    BMN []Yes   [x]No     RESTRICTIONS/PRECAUTIONS:   Latex Allergy:  [x]NO      []YES  Preferred Language for Healthcare:   [x]English       []other:    Pain level:  0/10     SUBJECTIVE:  Pt reports continued progress and improvement. OBJECTIVE:    Observation: Bilateral hip flexor and quad tightness  Test measurements:  LLE flexibility equal to or better than RLE for hamstrings, hip flexor, piriformis. Functional Scale: FOTO Hip   Score: 77/100 (23% impairment)  Date assessed: 2022      Functional Scale: FOTO Hip   Score: 88/100 (12% impairment)  Date assessed: 2022     Exercises/Interventions: Exercises in bold performed in department today. Items not bolded are carried forward from prior visits for continuity of the record.     Exercise/Equipment Resistance/Repetitions HEP Other comments   Recumbent Bike 8 min  Resistance 2.5  Seat 11 []    Prone Hip Flexor Stretch with Strap  [x]    Standing Hip Flexor Stretch  [x]    Foam Roll   Sidelying on L IT Band 5 min [x]    \"The Stick\" Massage to L IT Band  []    Sidelying Clamshells with Resistance  [x]      []      []      []      []      []      []      []      []      []      []      []      []      Home Exercise Program:  Access Code: [email or web address]  URL: [employer]!/  Date: 07/25/2022  Prepared by: [employer] Staff    Exercises  ITB Stretch at Wall - 1 x daily - 7 x weekly - 10 reps - 10 hold  Supine ITB Stretch with Strap - 1 x daily - 7 x weekly - 10 reps - 10 hold  Prone Quadriceps Stretch with Strap - 1 x daily - 7 x weekly - 10 reps - 10 hold      Date: 08/01/2022  Prepared by: [employer] Staff    Exercises  Standing Quadriceps Stretch - 1 x daily - 7 x weekly - 10 reps - 10 hold  Prone Hip Flexor Stretch on Table with Strap - 1 x daily - 7 x weekly - 10 reps - 10 hold  Standing Hip Flexor Stretch - 1 x daily - 7 x weekly - 10 reps - 10 hold  Supine Hamstring Stretch with Strap - 1 x daily - 7 x weekly - 10 reps - 10 hold  Standing Hamstring Stretch with Step - 1 x daily - 7 x weekly - 10 reps - 10 hold      Therapeutic Exercise: (10 min)   [x] (72982) Provided verbal/tactile cueing for activities related to strengthening, flexibility, endurance, ROM for improvements in LE, proximal hip, and core control with self-care, mobility, lifting, ambulation. Neuromuscular Facilitation:   [] (69498) Provided verbal/tactile cueing for activities related to improving balance, coordination, kinesthetic sense, posture, motor skill, proprioception to assist with LE, proximal hip, and core control in self-care, mobility, lifting, ambulation and eccentric single leg control.      Therapeutic Activities:   [] (72518) Provided verbal/tactile cueing for activities related to improving balance, coordination, kinesthetic sense, posture, motor skill, proprioception and motor activation to allow for proper function of core, proximal hip and LE with self-care and ADLs and functional mobility. Gait Training:   [] (89477) Provided training and instruction to the patient for proper LE, core and proximal hip recruitment and positioning and eccentric body weight control with ambulation re-education including up and down stairs     Home Exercise Program:    [] (24549) Reviewed/Progressed HEP activities related to strengthening, flexibility, endurance, ROM of core, proximal hip and LE for functional self-care, mobility, lifting and ambulation/stair navigation   [] (32936) Reviewed/Progressed HEP activities related to improving balance, coordination, kinesthetic sense, posture, motor skill, proprioception of core, proximal hip and LE for self-care, mobility, lifting, and ambulation/stair navigation      Manual Treatments: (30 min) PT performs manual hamsting, piriformis, glute, and IT band stretches bilaterally with patient in supine, and manual quad stretching bilaterally with patient in prone. [x] (17806) Provided manual therapy to mobilize LE, proximal hip and/or LS spine soft tissue/joints for the purpose of modulating pain, promoting relaxation,  increasing ROM, reducing/eliminating soft tissue swelling/inflammation/restriction, improving soft tissue extensibility and allowing for proper ROM for normal function with self-care, mobility, lifting and ambulation. Modalities:    [] Electric Stimulation:   [] Ultrasound:   [] Other:       Charges:  Timed Code Treatment Minutes: 40 min   Total Treatment Minutes: 40 min        [] RE-EVAL     [x] YW(23122) x     1  [] NMR (81336) x       [x] Manual (54892) x     2  [] TA (92194) x       [] Gait Training (10108) x       [] ES(attended) (50479)  [] ES (un) (23027)   [] [facility] T[facility] (30908)  [] Ultrasound (96582)  [] Other:      GOALS:  Patient stated goal: \"Eliminate hip/thigh tightness\"  [] Progressing: [x] Met: [] Not Met: [] Adjusted     Therapist goals for Patient:  Short Term Goals: To be achieved in: 2 weeks  1.  Independent in HEP and progression per patient tolerance, in order to prevent re-injury. [] Progressing: [x] Met: [] Not Met: [] Adjusted  2. Patient will have a decrease in pain to facilitate improvement in movement, function, and ADLs as indicated by Functional Deficits. [] Progressing: [x] Met: [] Not Met: [] Adjusted     Long Term Goals: To be achieved in: 8 weeks  1. Disability index score of 15% or less on the FOTO Hip to assist with reaching prior level of function. [] Progressing: [x] Met: [] Not Met: [] Adjusted  2. Patient will demonstrate increased L Hip functional AROM WNL and equal to non-involved side to allow for proper joint functioning as indicated by patients Functional Deficits. [] Progressing: [x] Met: [] Not Met: [] Adjusted  3. Patient will demonstrate an increase in Strength to good proximal hip strength and control, to allow for proper functional mobility as indicated by patients Functional Deficits. [] Progressing: [x] Met: [] Not Met: [] Adjusted  4. Patient will return to running/walking with briefcase without increased symptoms or restriction. [] Progressing: [x] Met: [] Not Met: [] Adjusted    ASSESSMENT:  Patient has met therapy goals. PT on hold for 2 weeks while patient travels, and if no regression is seen, Pt will be discharged from PT to HEP. Treatment/Activity Tolerance:  [x] Patient tolerated treatment well [] Patient limited by fatique  [] Patient limited by pain  [] Patient limited by other medical complications  [] Other:     Overall Progression Towards Functional goals/ Treatment Progress Update:  [x] Patient is progressing as expected towards functional goals listed. [] Progression is slowed due to complexities/Impairments listed. [] Progression has been slowed due to co-morbidities.   [] Plan just implemented, too soon to assess goals progression <30days   [] Goals require adjustment due to lack of progress  [] Patient is not progressing as expected and requires

## 2023-04-28 ENCOUNTER — HOSPITAL ENCOUNTER (OUTPATIENT)
Dept: PHYSICAL THERAPY | Age: 73
Setting detail: THERAPIES SERIES
Discharge: HOME | End: 2023-04-28
Payer: MEDICARE

## 2023-04-28 PROCEDURE — 97530 THERAPEUTIC ACTIVITIES: CPT | Performed by: PHYSICAL THERAPIST

## 2023-04-28 PROCEDURE — 97161 PT EVAL LOW COMPLEX 20 MIN: CPT | Performed by: PHYSICAL THERAPIST

## 2023-04-28 PROCEDURE — 97110 THERAPEUTIC EXERCISES: CPT | Performed by: PHYSICAL THERAPIST

## 2023-04-28 NOTE — FLOWSHEET NOTE
[facility] Outpatient [facility]  [address] [place] [address] [address], [address]  Phone: [phone]   Fax: [phone]    Physical Therapy Treatment Note/ Progress Report:     Date:  2023     Patient Name:  Juliana Olivera    :  1950  MRN: [id number]    Preferred Language for Healthcare:   [x]English       []other:    Referring Provider:  Dea Friend MD   Follow-up Visit Date:  ***  Plan of care signed:    [] Yes  [x] No    Medical Diagnosis:  Low back pain, unspecified [M54.50]  Other chronic pain [G89.29]    Treatment Diagnosis:  No diagnosis found. Insurance/Certification information:       Progress Report: []  Yes  [x]  No   If Yes, Date Range for reporting period:  Beginnin2023  Ending:      Progress report due (10 Rx/or 30 days whichever is less): ***    Recertification due (POC duration/ or 90 days whichever is less): ***     Visit # Insurance Allowable Auth Needed   1  / *** *** []Yes   []No     SUBJECTIVE:  See Eval  Pain level:  ***/10     OBJECTIVE:  See Eval  Observation:   Test measurements:      Functional Scale: ***   Score: ***  Date assessed: 2023      RESTRICTIONS/PRECAUTIONS:   Latex Allergy:  [x]NO      []YES    Exercises/Interventions: Exercises in bold performed in department today. Items not bolded are carried forward from prior visits for continuity of the record. Exercise/Equipment Resistance/Repetitions HEP Other comments     []      []      []      []      []      []      []      []      []      []      []      []      []      []      []      []      []      []      Home Exercise Program:      Therapeutic Exercise:   [x] (09415) Provided verbal/tactile cueing for activities related to strengthening, flexibility, endurance, ROM for improvements in LE, proximal hip, and core control with self-care, mobility, lifting, ambulation.     Neuromuscular Facilitation:   [] (73629) Provided verbal/tactile cueing for activities

## 2023-04-28 NOTE — THERAPY EVALUATION
[facility] Outpatient Therapy  [address] [place] [address], [address], [address]  Phone: [phone]   Fax: [phone]                                            Physical Therapy Certification    Dear Juni Pride MD,    We had the pleasure of evaluating the following patient for physical therapy services at [facility] ([facility] A summary of our findings can be found in the initial assessment below. This includes our plan of care. If you have any questions or concerns regarding these findings, please do not hesitate to contact me at the office phone number checked above. Thank you for the referral.       Physician Signature:_______________________________Date:__________________  By signing above (or electronic signature), therapist's plan is approved by physician      Patient: Viviana Gillette   : 1950   MRN: [id number]  Referring Physician: Juni Pride MD       Evaluation Date: 2023       Medical Diagnosis Information:  Low back pain, unspecified [M54.50]  Other chronic pain [G89.29]  Treatment Diagnosis Information:                                         Insurance information:   Medicare     Precautions/ Contra-indications:   Latex Allergy:  [x]NO      []YES   Preferred Language for Healthcare:   [x]English       []other:  C-SSRS Triggered by Intake questionnaire (Past 2 wk assessment):   [x] No, Questionnaire did not trigger screening.   [] Yes, Patient intake triggered further evaluation      [] C-SSRS Screening completed  [] PCP notified via Plan of Care  [] Emergency services notified    SUBJECTIVE:  History of Present Illness:      Pt presents with c/o R sided low back pain since around January, worst with position changes, especially getting up from the ground, or with getting situated in bed. Pt reports some soreness upon initially running, but then it subsides as he gets going.      Pain       Patient reports pain is 0 /10 pain at present and 9 /10

## 2023-05-01 ENCOUNTER — HOSPITAL ENCOUNTER (OUTPATIENT)
Dept: PHYSICAL THERAPY | Age: 73
Setting detail: THERAPIES SERIES
Discharge: HOME | End: 2023-05-01
Payer: MEDICARE

## 2023-05-01 PROCEDURE — 97140 MANUAL THERAPY 1/> REGIONS: CPT | Performed by: PHYSICAL THERAPIST

## 2023-05-01 PROCEDURE — 97112 NEUROMUSCULAR REEDUCATION: CPT | Performed by: PHYSICAL THERAPIST

## 2023-05-01 PROCEDURE — 97110 THERAPEUTIC EXERCISES: CPT | Performed by: PHYSICAL THERAPIST

## 2023-05-01 NOTE — FLOWSHEET NOTE
[facility] Outpatient Therapy  [address] [place] [address], [address], [address]  Phone: [phone]   Fax: [phone]    Physical Therapy Treatment Note/ Progress Report:     Date:  2023     Patient Name:  Elliot Rojas    :  1950  MRN: [id number]    Preferred Language for Healthcare:   [x]English       []other:    Referring Provider:  Dexter Galvez MD   Follow-up Visit Date:  unknown  Plan of care signed:    [] Yes  [x] No    Medical Diagnosis:  Low back pain, unspecified [M54.50]  Other chronic pain [G89.29]    Treatment Diagnosis:  M54.50 - Low Back Pain    Insurance/Certification information:    Medicare    Progress Report: []  Yes  [x]  No   If Yes, Date Range for reporting period:  Beginnin2023  Ending:      Progress report due (10 Rx/or 30 days whichever is less):     Recertification due (POC duration/ or 90 days whichever is less): 2023     Visit # Insurance Allowable Auth Needed    BMN []Yes   [x]No     SUBJECTIVE:  Pt reports feeling pretty good; has not had any significant pain in last day or so. Pt states that stretches seem to be helping already. Pain level:  No stated pain this visit. OBJECTIVE:    Observation:   Test measurements:      Functional Scale: MARTA   Score: 5/50 = 10% impairment  Date assessed: 2023    RESTRICTIONS/PRECAUTIONS:   Latex Allergy:  [x]NO      []YES    Exercises/Interventions: Exercises in bold performed in department today. Items not bolded are carried forward from prior visits for continuity of the record.     Exercise/Equipment Resistance/Repetitions HEP Other comments   NuStep  Seat 12, Arms 9 5 min  Resistance 4 []    TrA isometric 10 x 3\"   [] PT educated on technique   Posterior Pelvic Tilt 10 x  []    Scap Retraction 10 x  []    Doorway Pec Stretch  10 x 10\" []    Prone Quad Stretch 10 x 10\" with belt []      []      []      []      []      []      []      []      []      []

## 2023-05-04 ENCOUNTER — HOSPITAL ENCOUNTER (OUTPATIENT)
Dept: PHYSICAL THERAPY | Age: 73
Setting detail: THERAPIES SERIES
Discharge: HOME | End: 2023-05-04
Payer: MEDICARE

## 2023-05-04 PROCEDURE — 97140 MANUAL THERAPY 1/> REGIONS: CPT | Performed by: PHYSICAL THERAPIST

## 2023-05-04 PROCEDURE — 97112 NEUROMUSCULAR REEDUCATION: CPT | Performed by: PHYSICAL THERAPIST

## 2023-05-04 PROCEDURE — 97110 THERAPEUTIC EXERCISES: CPT | Performed by: PHYSICAL THERAPIST

## 2023-05-04 NOTE — FLOWSHEET NOTE
Other:    GOALS:  Patient stated goal: \"To reduce piercing pain when changing positions\"  [] Progressing: [] Met: [] Not Met: [] Adjusted     Therapist goals for Patient:   Short Term Goals: To be achieved in: 2 weeks  1. Independent in HEP and progression per patient tolerance, in order to prevent re-injury. [x] Progressing: [] Met: [] Not Met: [] Adjusted  2. Patient will have a decrease in pain to facilitate improvement in movement, function, and ADLs as indicated by Functional Deficits. [x] Progressing: [] Met: [] Not Met: [] Adjusted     Long Term Goals: To be achieved in: 6 weeks  1. Disability index score of 0% or less on the MARTA to assist with reaching prior level of function. [] Progressing: [] Met: [] Not Met: [] Adjusted  2. Patient will demonstrate increased pain-free lumbar AROM and LE flexibility WNL and equal to non-involved side to allow for proper joint functioning as indicated by patients Functional Deficits. [] Progressing: [] Met: [] Not Met: [] Adjusted  3. Patient will demonstrate an increase in Strength to good proximal hip strength and control, to allow for proper functional mobility as indicated by patients Functional Deficits. [] Progressing: [] Met: [] Not Met: [] Adjusted     ASSESSMENT:  Pt reports to PT for low back pain, R sided more than left, limiting prolonged functional activities and positioning. Pt is seeing benefits from stretching, but still having some soreness and pain. Pt will continue to benefit from skilled PT to return to PLOF. Treatment/Activity Tolerance:  [x] Patient tolerated treatment well [] Patient limited by fatique  [] Patient limited by pain  [] Patient limited by other medical complications  [] Other:     Overall Progression Towards Functional goals/ Treatment Progress Update:  [] Patient is progressing as expected towards functional goals listed. [] Progression is slowed due to complexities/Impairments listed.   [] Progression has been slowed

## 2023-05-08 ENCOUNTER — HOSPITAL ENCOUNTER (OUTPATIENT)
Dept: PHYSICAL THERAPY | Age: 73
Setting detail: THERAPIES SERIES
Discharge: HOME | End: 2023-05-08
Payer: MEDICARE

## 2023-05-08 NOTE — CARE COORDINATION
[facility]  [address] [place] [address], [address]  Phone: [phone]   Fax: [phone]    Physical Therapy Missed Visit Note     Date:  2023    Patient Name:  Jacqui Hale      :  1950    MRN: [id number]      Cancelled visits to date: 0  No-shows to date: 1    For today's appointment patient:  []  Cancelled  []  Rescheduled appointment  [x]  No-show     Reason given by patient:  []  Patient ill  []  Conflicting appointment  []  No transportation    []  Conflict with work  []  No reason given  [x]  Other:  Pt called later in the day and was caught up with a client at work   Comments:      Electronically signed by:  Jon Cesar, PT, PT

## 2023-10-25 ENCOUNTER — HOSPITAL ENCOUNTER (EMERGENCY)
Age: 73
Discharge: HOME | End: 2023-10-25
Attending: EMERGENCY MEDICINE
Payer: MEDICARE

## 2023-10-25 VITALS
BODY MASS INDEX: 27.2 KG/M2 | SYSTOLIC BLOOD PRESSURE: 134 MMHG | TEMPERATURE: 98.5 F | OXYGEN SATURATION: 98 % | HEIGHT: 70 IN | WEIGHT: 190 LBS | HEART RATE: 77 BPM | DIASTOLIC BLOOD PRESSURE: 84 MMHG | RESPIRATION RATE: 12 BRPM

## 2023-10-25 DIAGNOSIS — R04.0 EPISTAXIS: Primary | ICD-10-CM

## 2023-10-25 PROCEDURE — 99283 EMERGENCY DEPT VISIT LOW MDM: CPT

## 2023-10-25 PROCEDURE — 30901 CONTROL OF NOSEBLEED: CPT

## 2023-10-25 RX ORDER — LOSARTAN POTASSIUM 25 MG/1
25 TABLET ORAL DAILY
COMMUNITY

## 2023-10-25 RX ORDER — LIDOCAINE HYDROCHLORIDE AND EPINEPHRINE 10; 10 MG/ML; UG/ML
20 INJECTION, SOLUTION INFILTRATION; PERINEURAL ONCE
Status: DISCONTINUED | OUTPATIENT
Start: 2023-10-25 | End: 2023-10-25 | Stop reason: HOSPADM

## 2023-10-25 RX ORDER — OXYMETAZOLINE HYDROCHLORIDE 0.05 G/100ML
2 SPRAY NASAL 2 TIMES DAILY
Qty: 15 ML | Refills: 0 | Status: SHIPPED | OUTPATIENT
Start: 2023-10-25 | End: 2023-10-27

## 2023-10-25 RX ORDER — VERAPAMIL HYDROCHLORIDE 240 MG/1
240 TABLET, FILM COATED, EXTENDED RELEASE ORAL NIGHTLY
COMMUNITY

## 2023-10-25 RX ORDER — FINASTERIDE 5 MG/1
5 TABLET, FILM COATED ORAL DAILY
COMMUNITY

## 2023-10-25 RX ORDER — OXYMETAZOLINE HYDROCHLORIDE 0.05 G/100ML
2 SPRAY NASAL ONCE
Status: DISCONTINUED | OUTPATIENT
Start: 2023-10-25 | End: 2023-10-25 | Stop reason: HOSPADM

## 2023-10-25 ASSESSMENT — PAIN - FUNCTIONAL ASSESSMENT
PAIN_FUNCTIONAL_ASSESSMENT: NONE - DENIES PAIN
PAIN_FUNCTIONAL_ASSESSMENT: NONE - DENIES PAIN

## 2023-10-25 NOTE — ED NOTES
Patient to ed with complaints of a left nose bleed which started this am after blowing his nose, patient reports bleeding has been intermittent, no bleeding at this time.      Pearl Mac RN  10/25/23 0359

## 2023-10-25 NOTE — ED NOTES
Patient given prescription, discharge instructions verbal and written, patient verbalized understanding. Alert/oriented X4, Clear speech.   Patient exhibits no distress, ambulates with steady gait per self leaving unit, no further request.      Brian Matias RN  10/25/23 8299

## 2023-10-27 ENCOUNTER — HOSPITAL ENCOUNTER (EMERGENCY)
Age: 73
Discharge: HOME | End: 2023-10-27
Attending: EMERGENCY MEDICINE
Payer: MEDICARE

## 2023-10-27 VITALS
WEIGHT: 187.1 LBS | DIASTOLIC BLOOD PRESSURE: 79 MMHG | BODY MASS INDEX: 26.79 KG/M2 | SYSTOLIC BLOOD PRESSURE: 156 MMHG | OXYGEN SATURATION: 97 % | TEMPERATURE: 97.7 F | RESPIRATION RATE: 14 BRPM | HEART RATE: 53 BPM | HEIGHT: 70 IN

## 2023-10-27 DIAGNOSIS — R04.0 RECURRENT EPISTAXIS: Primary | ICD-10-CM

## 2023-10-27 PROCEDURE — 99283 EMERGENCY DEPT VISIT LOW MDM: CPT

## 2023-10-27 ASSESSMENT — PAIN - FUNCTIONAL ASSESSMENT: PAIN_FUNCTIONAL_ASSESSMENT: NONE - DENIES PAIN

## 2023-10-27 NOTE — ED TRIAGE NOTES
Nose bleed L nares, not bleeding now. Pt seen here two days ago and discharged. Has been bleeding intermittently since. Was bleeding last PM and called PCP. Started bleeding this AM. Has appt on Monday with ENT. Wife with pt. Resp easy, skin w/d.

## 2023-10-27 NOTE — ED NOTES
Patient given d/c instructions with return verbalization. Nose not bleeding currently. Emphasis on f/u, to return with worsening s/s. Pt ambulated to lobby with steady gait.      Elmer Pacheco RN  10/27/23 3395

## 2023-10-27 NOTE — ED PROVIDER NOTES
[address] [facility] PROVIDER NOTE    Patient Identification  Pt Name: Duc Lepe  MRN: [id number]  [address] [facility] 1950  Date of evaluation: 10/27/2023  Provider: Bertha Aden MD  PCP: Halle Pappas MD    HPI  Duc Lepe is a 68 y.o. male who presents to the ED for ***. No other complaints, modifying factors or associated symptoms. Nursing notes reviewed. Allergies: Patient has no known allergies. Past medical history:   Past Medical History:   Diagnosis Date    Cluster headache     Headache     Neuropathy     Sinus headache      Past surgical history:   Past Surgical History:   Procedure Laterality Date    COLON SURGERY       Home medications:   Previous Medications    FINASTERIDE (PROSCAR) 5 MG TABLET    Take 1 tablet by mouth daily    FLUTICASONE (FLONASE) 50 MCG/ACT NASAL SPRAY    1 spray by Nasal route daily    GABAPENTIN (NEURONTIN) 300 MG CAPSULE    Take 1 capsule by mouth in the morning and 1 capsule in the evening. LORATADINE-PSEUDOEPHEDRINE (CLARITIN-D 12 HOUR) 5-120 MG PER TABLET    Take 1 tablet by mouth 2 times daily    LOSARTAN (COZAAR) 25 MG TABLET    Take 1 tablet by mouth daily    OXYMETAZOLINE (12 HOUR NASAL SPRAY) 0.05 % NASAL SPRAY    2 sprays by Nasal route 2 times daily For recurrent epistaxis, please use prior to holding pressure on nose. Do not use more than 2 days in a row. VERAPAMIL (CALAN SR) 240 MG EXTENDED RELEASE TABLET    Take 1 tablet by mouth nightly    VERAPAMIL (CALAN) 120 MG TABLET    Take 1 tablet by mouth daily    ZOLMITRIPTAN (ZOMIG) 5 MG NASAL SOLUTION    0.1 mLs by Nasal route once as needed for Migraine     Social history:  reports that he has never smoked. He does not have any smokeless tobacco history on file. He reports current alcohol use. He reports that he does not use drugs. Family history:  No family history on file.     REVIEW OF SYSTEMS  8 systems reviewed, pertinent positives per HPI otherwise noted to be

## 2024-05-17 ENCOUNTER — HOSPITAL ENCOUNTER (OUTPATIENT)
Dept: PHYSICAL THERAPY | Age: 74
Setting detail: THERAPIES SERIES
Discharge: HOME | End: 2024-05-17
Payer: MEDICARE

## 2024-05-17 DIAGNOSIS — M54.50 CHRONIC LOW BACK PAIN, UNSPECIFIED BACK PAIN LATERALITY, UNSPECIFIED WHETHER SCIATICA PRESENT: Primary | ICD-10-CM

## 2024-05-17 DIAGNOSIS — G89.29 CHRONIC LOW BACK PAIN, UNSPECIFIED BACK PAIN LATERALITY, UNSPECIFIED WHETHER SCIATICA PRESENT: Primary | ICD-10-CM

## 2024-05-17 PROCEDURE — 97530 THERAPEUTIC ACTIVITIES: CPT | Performed by: PHYSICAL THERAPIST

## 2024-05-17 PROCEDURE — 97161 PT EVAL LOW COMPLEX 20 MIN: CPT | Performed by: PHYSICAL THERAPIST

## 2024-05-17 PROCEDURE — 97110 THERAPEUTIC EXERCISES: CPT | Performed by: PHYSICAL THERAPIST

## 2024-05-17 NOTE — PLAN OF CARE
Manipulation: {Contraindications to Manipulation:80066::\"None\"}  Date of Surgery: ***  Other:    Red Flags:  {RedFlags:64006::\"None\"}    C-SSRS Triggered by Intake questionnaire:   Patient answered 'NO' to both behavioral questions on intake.  No further screening warranted    Preferred Language for Healthcare:  English    SUBJECTIVE EXAMINATION     Patient stated complaint/comments: Pt reports having bilateral low back pain/spasm for past 30-60 days, which is worst with getting up, walking, or prolonged standing.  Pt states he often has to support himself on the counter when standing to brush teeth, and requires support on walls, furniture, when walking to bathroom.        Test used Initial score  5/17/24 05/17/2024   Pain Summary VAS 8/10     Functional questionnaire Modified Oswestry 8/50 = 16% impairment    Other:              Pain:  Pain location: bilateral low back.   Patient describes pain to be  grabs, \"makes you catch your breath\"  Pain decreases with: {Easing Factors:38002}  Pain increases with: {PROVOKING FACTORS:95534}     Relevant Medical History: ***    Occupation/School:  Work/School Status: Part time  Job Duties/Demands: Sedentary  Active : Yes  Leisure/Hobbies/Sports: Running, Swimming, Weights, Walking    Current Functional Limitations:    Functional Complaints:  ***      PLOF:  No functional limitations  Pt's sleep is affected?   No     Social Support/Environment:  Lives with: spouse   Family/caregiver support needed prior to current illness: No   Home Accessibility/Assistive Devices:Patient is independent with all home mobility  Home Environment:  2-story  Main bath/bedroom upstairs     Review Of Systems (ROS):  [x] Performed Review of systems (Integumentary, CardioPulmonary, Neurological) by intake and observation. Intake form has been scanned into medical record. Patient has been instructed to contact their primary care physician regarding ROS issues if not already being addressed at this

## 2024-06-10 ENCOUNTER — HOSPITAL ENCOUNTER (OUTPATIENT)
Dept: PHYSICAL THERAPY | Age: 74
Setting detail: THERAPIES SERIES
Discharge: HOME | End: 2024-06-10
Payer: MEDICARE

## 2024-06-10 PROCEDURE — 97110 THERAPEUTIC EXERCISES: CPT | Performed by: PHYSICAL THERAPIST

## 2024-06-10 PROCEDURE — 97112 NEUROMUSCULAR REEDUCATION: CPT | Performed by: PHYSICAL THERAPIST

## 2024-06-10 PROCEDURE — 97140 MANUAL THERAPY 1/> REGIONS: CPT | Performed by: PHYSICAL THERAPIST

## 2024-06-10 NOTE — PLAN OF CARE
and activation/motor control patterns, modulating pain, improving soft tissue extensibility, and improving postural awareness. Patient will continue to benefit from ongoing evaluation and advanced clinical decision from a Physical Therapist to improve neuromuscular control, functional mobility, and proper body mechanics to safely return to PLOF without symptoms or restrictions.    Medical Necessity Documentation:  I certify that this patient meets the below criteria necessary for medical necessity for care and/or justification of therapy services:  The patient has functional impairments and/or activity limitations and would benefit from continued outpatient therapy services to address the deficits outlined in the patients goals    Prognosis for POC: [x] Good [] Fair  [] Poor    Patient requires continued skilled intervention: [x] Yes  [] No    CHARGE CAPTURE     PT CHARGE GRID   CPT Code (TIMED) minutes # CPT Code (UNTIMED) #     Therex (78496)  18 min 1  EVAL:LOW (53352 - Typically 20 minutes face-to-face)     Neuromusc. Re-ed (79028) 8 min 1  Re-Eval (95621)     Manual (65464) 16 min 1  Estim Unattended (17032)     Ther. Act (54969)    Mech. Traction (74184)     Gait (30070)    Dry Needle 1-2 muscle (45500)     Aquatic Therex (60931)    Dry Needle 3+ muscle (20561)     Iontophoresis (82183)    VASO (06871)     Ultrasound (21527)    Group Therapy (51245)     Estim Attended (66937)    Canalith Repositioning (29190)     Other:    Other:    Total Timed Code Tx Minutes 42 min 3       Total Treatment Minutes 42 min        Charge Justification:  (04442) THERAPEUTIC EXERCISE - Provided verbal/tactile cueing for activities related to strengthening, flexibility, endurance, ROM performed to prevent loss of range of motion, maintain or improve muscular strength or increase flexibility, following either an injury or surgery.   (96946) HOME EXERCISE PROGRAM - Reviewed/Progressed HEP activities related to strengthening,

## 2024-07-09 ENCOUNTER — HOSPITAL ENCOUNTER (OUTPATIENT)
Dept: PHYSICAL THERAPY | Age: 74
Setting detail: THERAPIES SERIES
Discharge: HOME | End: 2024-07-09
Payer: MEDICARE

## 2024-07-09 PROCEDURE — 97110 THERAPEUTIC EXERCISES: CPT | Performed by: PHYSICAL THERAPIST

## 2024-07-09 PROCEDURE — 97112 NEUROMUSCULAR REEDUCATION: CPT | Performed by: PHYSICAL THERAPIST

## 2024-07-09 PROCEDURE — 97140 MANUAL THERAPY 1/> REGIONS: CPT | Performed by: PHYSICAL THERAPIST

## 2024-07-09 NOTE — PLAN OF CARE
[facility]   [address], [place] [address]   office: [phone] fax: [phone]       Physical Therapy: TREATMENT/PROGRESS NOTE   Patient: Darnell Crum (74 y.o. male)   Examination Date: 2024   :  1950 MRN: [id number]   Visit #: 3   Insurance Allowable Auth Needed   Medical Necessity []Yes    [x]No    Insurance: Payor: MEDICARE / Plan: MEDICARE PART A AND B / Product Type: *No Product type* /   Insurance ID: [id number] - (Medicare)  Secondary Insurance (if applicable): [facility]   Treatment Diagnosis:     ICD-10-CM   1. Chronic low back pain, unspecified back pain laterality, unspecified whether sciatica present  M54.50    G89.29         Medical Diagnosis:  Other chronic pain [G89.29]  Low back pain, unspecified [M54.50]   Referring Physician: Boni Cuellar MD  PCP: Boni Cuellar MD     Plan of care signed (Y/N): N    Date of Patient follow up with Physician:      Progress Report/POC: YES, Date Range for this report: 24 to 24  POC update due: (10 visits /OR AUTH LIMITS, whichever is less)                                              Precautions/ Contra-indications:           Latex allergy:  NO  Pacemaker:    NO  Contraindications for Manipulation: osteoporosis /osteopenia  Date of Surgery: NA  Other:    Red Flags:  None    C-SSRS Triggered by Intake questionnaire:   Patient answered 'NO' to both behavioral questions on intake.  No further screening warranted    Preferred Language for Healthcare:  English    SUBJECTIVE EXAMINATION     Patient stated complaint/comments:  Patient reports having difficulty with consistency on exercises due to travel and having grandchildren in town.  States that pain has decreased, but that pain was starting to increase as he was not performing stretching as often.  Pt reports he is now trying to get back into a good routine.  Pt states stiffness is worst in